# Patient Record
Sex: FEMALE | Race: WHITE
[De-identification: names, ages, dates, MRNs, and addresses within clinical notes are randomized per-mention and may not be internally consistent; named-entity substitution may affect disease eponyms.]

---

## 2018-03-12 ENCOUNTER — HOSPITAL ENCOUNTER (EMERGENCY)
Dept: HOSPITAL 17 - NEPE | Age: 33
Discharge: HOME | End: 2018-03-12
Payer: COMMERCIAL

## 2018-03-12 VITALS
RESPIRATION RATE: 16 BRPM | TEMPERATURE: 97.2 F | OXYGEN SATURATION: 100 % | SYSTOLIC BLOOD PRESSURE: 173 MMHG | DIASTOLIC BLOOD PRESSURE: 82 MMHG | HEART RATE: 86 BPM

## 2018-03-12 VITALS — HEIGHT: 63 IN | BODY MASS INDEX: 35.16 KG/M2 | WEIGHT: 198.42 LBS

## 2018-03-12 VITALS — RESPIRATION RATE: 18 BRPM | OXYGEN SATURATION: 99 %

## 2018-03-12 DIAGNOSIS — R11.2: ICD-10-CM

## 2018-03-12 DIAGNOSIS — K58.9: ICD-10-CM

## 2018-03-12 DIAGNOSIS — R10.13: Primary | ICD-10-CM

## 2018-03-12 LAB
ALBUMIN SERPL-MCNC: 3.8 GM/DL (ref 3.4–5)
ALP SERPL-CCNC: 70 U/L (ref 45–117)
ALT SERPL-CCNC: 24 U/L (ref 10–53)
AST SERPL-CCNC: 15 U/L (ref 15–37)
BASOPHILS # BLD AUTO: 0.1 TH/MM3 (ref 0–0.2)
BASOPHILS NFR BLD: 0.5 % (ref 0–2)
BILIRUB SERPL-MCNC: 0.4 MG/DL (ref 0.2–1)
BUN SERPL-MCNC: 12 MG/DL (ref 7–18)
CALCIUM SERPL-MCNC: 8.4 MG/DL (ref 8.5–10.1)
CHLORIDE SERPL-SCNC: 106 MEQ/L (ref 98–107)
COLOR UR: (no result)
CREAT SERPL-MCNC: 0.77 MG/DL (ref 0.5–1)
CRP SERPL-MCNC: 0.45 MG/DL (ref 0–0.3)
EOSINOPHIL # BLD: 0.1 TH/MM3 (ref 0–0.4)
EOSINOPHIL NFR BLD: 0.6 % (ref 0–4)
ERYTHROCYTE [DISTWIDTH] IN BLOOD BY AUTOMATED COUNT: 12.7 % (ref 11.6–17.2)
GFR SERPLBLD BASED ON 1.73 SQ M-ARVRAT: 87 ML/MIN (ref 89–?)
GLUCOSE SERPL-MCNC: 121 MG/DL (ref 74–106)
GLUCOSE UR STRIP-MCNC: (no result) MG/DL
HCO3 BLD-SCNC: 27.2 MEQ/L (ref 21–32)
HCT VFR BLD CALC: 34.4 % (ref 35–46)
HGB BLD-MCNC: 12.1 GM/DL (ref 11.6–15.3)
HGB UR QL STRIP: (no result)
KETONES UR STRIP-MCNC: (no result) MG/DL
LYMPHOCYTES # BLD AUTO: 1.7 TH/MM3 (ref 1–4.8)
LYMPHOCYTES NFR BLD AUTO: 16.7 % (ref 9–44)
MAGNESIUM SERPL-MCNC: 2.1 MG/DL (ref 1.5–2.5)
MCH RBC QN AUTO: 32 PG (ref 27–34)
MCHC RBC AUTO-ENTMCNC: 35.3 % (ref 32–36)
MCV RBC AUTO: 90.7 FL (ref 80–100)
MONOCYTE #: 0.5 TH/MM3 (ref 0–0.9)
MONOCYTES NFR BLD: 4.6 % (ref 0–8)
MUCOUS THREADS #/AREA URNS LPF: (no result) /LPF
NEUTROPHILS # BLD AUTO: 8.1 TH/MM3 (ref 1.8–7.7)
NEUTROPHILS NFR BLD AUTO: 77.6 % (ref 16–70)
NITRITE UR QL STRIP: (no result)
PLATELET # BLD: 264 TH/MM3 (ref 150–450)
PMV BLD AUTO: 7.3 FL (ref 7–11)
PROT SERPL-MCNC: 7.3 GM/DL (ref 6.4–8.2)
RBC # BLD AUTO: 3.8 MIL/MM3 (ref 4–5.3)
SODIUM SERPL-SCNC: 140 MEQ/L (ref 136–145)
SP GR UR STRIP: 1.01 (ref 1–1.03)
SQUAMOUS #/AREA URNS HPF: 3 /HPF (ref 0–5)
URINE LEUKOCYTE ESTERASE: (no result)
WBC # BLD AUTO: 10.4 TH/MM3 (ref 4–11)

## 2018-03-12 PROCEDURE — 86140 C-REACTIVE PROTEIN: CPT

## 2018-03-12 PROCEDURE — 71045 X-RAY EXAM CHEST 1 VIEW: CPT

## 2018-03-12 PROCEDURE — 84703 CHORIONIC GONADOTROPIN ASSAY: CPT

## 2018-03-12 PROCEDURE — 74177 CT ABD & PELVIS W/CONTRAST: CPT

## 2018-03-12 PROCEDURE — 96361 HYDRATE IV INFUSION ADD-ON: CPT

## 2018-03-12 PROCEDURE — 81001 URINALYSIS AUTO W/SCOPE: CPT

## 2018-03-12 PROCEDURE — 96374 THER/PROPH/DIAG INJ IV PUSH: CPT

## 2018-03-12 PROCEDURE — 83735 ASSAY OF MAGNESIUM: CPT

## 2018-03-12 PROCEDURE — 80053 COMPREHEN METABOLIC PANEL: CPT

## 2018-03-12 PROCEDURE — 96375 TX/PRO/DX INJ NEW DRUG ADDON: CPT

## 2018-03-12 PROCEDURE — 83690 ASSAY OF LIPASE: CPT

## 2018-03-12 PROCEDURE — 99285 EMERGENCY DEPT VISIT HI MDM: CPT

## 2018-03-12 PROCEDURE — 85025 COMPLETE CBC W/AUTO DIFF WBC: CPT

## 2018-03-12 NOTE — PD
HPI


Chief Complaint:  Abdominal Pain


Time Seen by Provider:  01:16


Travel History


International Travel<30 days:  No


Contact w/Intl Traveler<30days:  No


Traveled to known affect area:  No





History of Present Illness


HPI


The patient is a 32 year old female who presents to the [facility] 

emergency department with a history of right upper quadrant abdominal pain that 

she reports began at 8:30 PM.  She reports that the abdominal pain is sharp in 

character and that she also has associated pain that radiates up into her 

chest.  She reports having nausea and vomiting 1.  She reports having similar 

pain in the past, however it never lasted this long.  She reports that her last 

meal was at 4 PM.  She reports that she has been diagnosed with a gallstone in 

the past, however she denies ever having problems with gallstones previously.  

She reports 2 weeks ago having endoscopy and colonoscopy and being diagnosed 

with irritable bowel syndrome.  She does take omeprazole as needed for this.  

She denies having any worsening acid reflux or heartburn symptoms.  She denies 

having any shortness of breath.  She denies having any recent fevers or chills.

  On review of systems otherwise, the patient denies having any recent cough or 

congestion, neck pain, diarrhea, urinary symptoms, or neurologic symptoms.  The 

patient last bowel movement was earlier today.  Her last bowel movement was 

reportedly normal.





LMP: Ended 2 days ago.





PFSH


Past Medical History


*** Narrative Medical


The patient's past medical history is significant for irritable bowel syndrome, 

hemorrhoids.





Past Surgical History


*** Narrative Surgical


The patient's past surgical history is significant for none.





Social History


Alcohol Use:  No


Tobacco Use:  No


Substance Use:  No





Allergies-Medications


(Allergen,Severity, Reaction):  


Coded Allergies:  


     No Known Allergies (Unverified , 3/12/18)


Narrative Medication


Omeprazole as needed





Review of Systems


Except as stated in HPI:  all other systems reviewed are Neg


General / Constitutional:  No: Fever


Eyes:  No: Visual changes


HENT:  No: Headaches


Cardiovascular:  Positive: Chest Pain or Discomfort


Respiratory:  No: Shortness of Breath


Gastrointestinal:  Positive: Nausea, Vomiting, Abdominal Pain, No: Diarrhea, 

Changes in Bowel Habits


Genitourinary:  No: Dysuria


Musculoskeletal:  No: Pain


Skin:  No Rash


Neurologic:  No: Weakness, Focal Abnormalities, Change in Mentation, Slurred 

Speech, Sensory Disturbance


Psychiatric:  No: Depression


Endocrine:  No: Polydipsia


Hematologic/Lymphatic:  No: Easy Bruising





Physical Exam


Narrative


General: 


The patient is a well-developed well-nourished female in no acute distress. 





Head and Neck exam: 


Head is normocephalic atraumatic. 


Eyes: EOMI, pupils are equal round and reactive to light. 


Nose: Midline septum with pink mucous membranes 


Mouth: Dentition unremarkable. Moist mucus membranes. Posterior oropharynx is 

not erythematous. No tonsillar hypertrophy. Uvula midline. Airway patent. 


Neck: No palpable lymphadenopathy. No nuchal rigidity. No thyromegaly. 





Cardiovascular: 


Regular rate and rhythm without murmurs, gallops, or rubs. 





Lungs: 


Clear to auscultation bilaterally. No wheezes, rhonchi, or rales.


 


Abdomen:


Soft, with reported tenderness on palpation in the midepigastric area, no other 

tenderness on palpation of the other quadrants of the abdomen.  No tenderness 

on palpation of McBurney's point.  Negative Knight sign.  Normal bowel sounds 

are audible.  No guarding, rebound, or rigidity.





Extremities: 


No clubbing, cyanosis, or edema. 2+ pulses in all 4 extremities.  No calf 

tenderness on palpation.





Back: 


No spinous process tenderness to palpation. No costovertebral angle tenderness 

to palpation. 





Neurologic Exam: Grossly nonfocal.





Skin Exam: No rash noted. Intact skin that is warm and dry.





Data


Data


Last Documented VS





Vital Signs








  Date Time  Temp Pulse Resp B/P (MAP) Pulse Ox O2 Delivery O2 Flow Rate FiO2


 


3/12/18 01:35   18  99 Room Air  


 


3/12/18 00:28 97.2 86  173/82 (112)    








Orders





 Orders


Electrocardiogram (3/12/18 01:21)


Complete Blood Count With Diff (3/12/18 01:21)


Comprehensive Metabolic Panel (3/12/18 01:21)


C-Reactive Protein (Crp) (3/12/18 01:21)


Lipase (3/12/18 01:21)


Urinalysis - C+S If Indicated (3/12/18 01:21)


Magnesium (Mg) (3/12/18 01:21)


Chest, Single Ap (3/12/18 01:21)


Iv Access Insert/Monitor (3/12/18 01:21)


Ecg Monitoring (3/12/18 01:21)


Oximetry (3/12/18 01:21)


Ed Urine Pregnancytest Poc (3/12/18 01:21)


Sodium Chlor 0.9% 1000 Ml Inj (Ns 1000 M (3/12/18 01:45)


Ondansetron Inj (Zofran Inj) (3/12/18 01:45)


Ct Abd/Pel W Iv Contrast(Rout) (3/12/18 02:33)


Ketorolac Inj (Toradol Inj) (3/12/18 02:45)


Iohexol 350 Inj (Omnipaque 350 Inj) (3/12/18 03:40)


Ed Discharge Order (3/12/18 04:14)





Labs





Laboratory Tests








Test


  3/12/18


01:30 3/12/18


03:00


 


White Blood Count 10.4 TH/MM3  


 


Red Blood Count 3.80 MIL/MM3  


 


Hemoglobin 12.1 GM/DL  


 


Hematocrit 34.4 %  


 


Mean Corpuscular Volume 90.7 FL  


 


Mean Corpuscular Hemoglobin 32.0 PG  


 


Mean Corpuscular Hemoglobin


Concent 35.3 % 


  


 


 


Red Cell Distribution Width 12.7 %  


 


Platelet Count 264 TH/MM3  


 


Mean Platelet Volume 7.3 FL  


 


Neutrophils (%) (Auto) 77.6 %  


 


Lymphocytes (%) (Auto) 16.7 %  


 


Monocytes (%) (Auto) 4.6 %  


 


Eosinophils (%) (Auto) 0.6 %  


 


Basophils (%) (Auto) 0.5 %  


 


Neutrophils # (Auto) 8.1 TH/MM3  


 


Lymphocytes # (Auto) 1.7 TH/MM3  


 


Monocytes # (Auto) 0.5 TH/MM3  


 


Eosinophils # (Auto) 0.1 TH/MM3  


 


Basophils # (Auto) 0.1 TH/MM3  


 


CBC Comment DIFF FINAL  


 


Differential Comment   


 


Blood Urea Nitrogen 12 MG/DL  


 


Creatinine 0.77 MG/DL  


 


Random Glucose 121 MG/DL  


 


Total Protein 7.3 GM/DL  


 


Albumin 3.8 GM/DL  


 


Calcium Level 8.4 MG/DL  


 


Magnesium Level 2.1 MG/DL  


 


Alkaline Phosphatase 70 U/L  


 


Aspartate Amino Transf


(AST/SGOT) 15 U/L 


  


 


 


Alanine Aminotransferase


(ALT/SGPT) 24 U/L 


  


 


 


Total Bilirubin 0.4 MG/DL  


 


Sodium Level 140 MEQ/L  


 


Potassium Level 3.5 MEQ/L  


 


Chloride Level 106 MEQ/L  


 


Carbon Dioxide Level 27.2 MEQ/L  


 


Anion Gap 7 MEQ/L  


 


Estimat Glomerular Filtration


Rate 87 ML/MIN 


  


 


 


C-Reactive Protein 0.45 MG/DL  


 


Lipase 138 U/L  


 


Urine Color  LIGHT-YELLOW 


 


Urine Turbidity  CLEAR 


 


Urine pH  6.0 


 


Urine Specific Gravity  1.011 


 


Urine Protein  NEG mg/dL 


 


Urine Glucose (UA)  NEG mg/dL 


 


Urine Ketones  NEG mg/dL 


 


Urine Occult Blood  TRACE 


 


Urine Nitrite  NEG 


 


Urine Bilirubin  NEG 


 


Urine Urobilinogen


  


  LESS THAN 2.0


MG/DL


 


Urine Leukocyte Esterase  NEG 


 


Urine RBC


  


  LESS THAN 1


/hpf


 


Urine WBC  1 /hpf 


 


Urine Squamous Epithelial


Cells 


  3 /hpf 


 


 


Urine Mucus  FEW /lpf 


 


Microscopic Urinalysis Comment


  


  CULT NOT


INDICATED











MDM


Medical Decision Making


Medical Screen Exam Complete:  Yes


Emergency Medical Condition:  Yes


Medical Record Reviewed:  Yes


Interpretation(s)





Last Impressions








Abdomen/Pelvis CT 3/12/18 0233 Signed





Impressions: 





 Service Date/Time:  Monday, March 12, 2018 03:25 - CONCLUSION: Normal 





 examination.       Elías Friedman Jr., MD 


 


Chest X-Ray 3/12/18 0121 Signed





Impressions: 





 Service Date/Time:  Monday, March 12, 2018 01:27 - CONCLUSION: Normal 





 examination.       Elías Friedman Jr., MD 








Differential Diagnosis


Biliary colic, versus acute cholecystitis, versus peptic ulcer disease, versus 

gastritis, versus acid reflux, versus pancreatitis


Narrative Course


During the course of the patient's emergency department visit, the patient's 

history, examination, and differential diagnosis were reviewed with the 

patient. The patient was placed on a cardiac monitor with oximetry and frequent 

blood pressure monitoring. The patient had  IV access obtained and blood work 

sent for analysis. 





The patient was initially provided normal saline 1 L IV fluid bolus, Zofran 4 

mg IV.





The patient's laboratory studies were reviewed and remarkable for a white count 

of 10.4, hemoglobin 12.1, platelets 264 with 77.6 neutrophils, CMP is 

remarkable for a GFR of 87, glucose 121, calcium 8.4, C-reactive protein 0.45, 

lipase 138, urinalysis shows trace occult blood, otherwise unremarkable.





Radiology studies were reviewed and remarkable for a chest x-ray that shows no 

acute abnormality.  CT scan of the abdomen and pelvis shows no acute 

abnormality.  No evidence of calcified gallstones.





The patient will be discharged from the emergency department and continued on 

omeprazole.  The patient was given a prescription for Zofran.





The patient is resting comfortably and feels better, is alert and in no 

distress. The patient's results and examination findings were discussed with 

the patient. The repeat examination is unremarkable and benign. The history, 

exam, diagnostic testing, and current condition do not suggest any significant 

pathology to warrant further testing, continued ED treatment, admission, or 

surgical evaluation at this point. The vital signs have been stable. The 

patient does not have uncontrollable pain, intractable vomiting, or other 

significant symptoms. The patient's condition is stable and appropriate for 

discharge. The patient will pursue further outpatient evaluation with a primary 

care physician or other designated or consulting physician as indicated in the 

discharge instructions. The patient expressed understanding and was agreeable 

with this plan.





Diagnosis





 Primary Impression:  


 Abdominal pain


 Qualified Codes:  R10.13 - Epigastric pain


 Additional Impression:  


 Vomiting


 Qualified Codes:  R11.2 - Nausea with vomiting, unspecified


Referrals:  


Primary Care Physician


2 days


Patient Instructions:  Abdominal Pain (ED), Acute Nausea and Vomiting (ED), 

General Instructions





***Additional Instructions:  


Take omeprazole consistently over the next week.  Follow-up with her primary 

care physician for reexamination for improvement in 2 days


***Med/Other Pt SpecificInfo:  Prescription(s) given


Scripts


Ondansetron Odt (Zofran Odt) 4 Mg Tab


4 MG SL Q6HR Y for Nausea/Vomiting, #7 TAB 0 Refills


   Prov: Shelley Hernandez MD         3/12/18


Disposition:  01 DISCHARGE HOME


Condition:  Stable











Shelley Hernandez MD Mar 12, 2018 01:36

## 2018-03-12 NOTE — RADRPT
EXAM DATE/TIME:  03/12/2018 01:27 

 

[facility] COMPARISON:     

No previous studies available for comparison.

 

                     

INDICATIONS :     

Shortness of breath.

                     

 

MEDICAL HISTORY :     

None.          

 

SURGICAL HISTORY :     

None.   

 

ENCOUNTER:     

Initial                                        

 

ACUITY:     

1 day      

 

PAIN SCORE:     

0/10

 

LOCATION:     

Bilateral chest 

 

FINDINGS:     

A single view of the chest demonstrates the lungs to be symmetrically aerated without evidence of mas
s, infiltrate or effusion.  The cardiomediastinal contours are unremarkable.  Osseous structures are 
intact.

 

CONCLUSION:     Normal examination.  

 

 

 

 Elías Friedman Jr., MD on March 12, 2018 at 1:44           

Board Certified Radiologist.

 This report was verified electronically.

## 2018-03-12 NOTE — RADRPT
EXAM DATE/TIME:  03/12/2018 03:25 

 

[facility] COMPARISON:     

No previous studies available for comparison.

 

 

INDICATIONS :     

Right upper qaudrant pain.

                      

 

IV CONTRAST:     

94 cc Omnipaque 350 (iohexol) IV 

 

 

ORAL CONTRAST:      

No oral contrast ingested.

                      

 

RADIATION DOSE:     

15.36 CTDIvol (mGy) 

 

 

MEDICAL HISTORY :     

None  

 

SURGICAL HISTORY :      

None. 

 

ENCOUNTER:      

Initial

 

ACUITY:      

1 day

 

PAIN SCALE:      

10/10

 

LOCATION:       

Right upper quadrant 

 

TECHNIQUE:     

Volumetric scanning of the abdomen and pelvis was performed.  Using automated exposure control and ad
justment of the mA and/or kV according to patient size, radiation dose was kept as low as reasonably 
achievable to obtain optimal diagnostic quality images.  DICOM format image data is available electro
nically for review and comparison.  

 

FINDINGS:     

 

LOWER LUNGS:     

The visualized lower lungs are clear.

 

LIVER:     

Homogeneous density without lesion.  There is no dilation of the biliary tree.  No calcified gallston
es.

 

SPLEEN:     

Normal size without lesion.

 

PANCREAS:     

Within normal limits.

 

KIDNEYS:     

Normal in size and shape.  There is no mass, stone or hydronephrosis.

 

ADRENAL GLANDS:     

Within normal limits.

 

VASCULAR:     

There is no aortic aneurysm.

 

BOWEL/MESENTERY:     

The stomach, small bowel, and colon demonstrate no acute abnormality.  There is no free intraperitone
al air or fluid.

 

ABDOMINAL WALL:     

Within normal limits.

 

RETROPERITONEUM:     

There is no lymphadenopathy. 

 

BLADDER:     

No wall thickening or mass. 

 

REPRODUCTIVE:     

Within normal limits.

 

INGUINAL:     

There is no lymphadenopathy or hernia. 

 

MUSCULOSKELETAL:     

Within normal limits for patient age. 

 

CONCLUSION:     Normal examination.  

 

 

 

 Elías Friedman Jr., MD on March 12, 2018 at 3:44           

Board Certified Radiologist.

 This report was verified electronically.